# Patient Record
Sex: MALE | Race: WHITE | Employment: OTHER | URBAN - METROPOLITAN AREA
[De-identification: names, ages, dates, MRNs, and addresses within clinical notes are randomized per-mention and may not be internally consistent; named-entity substitution may affect disease eponyms.]

---

## 2017-01-04 ENCOUNTER — OFFICE VISIT (OUTPATIENT)
Dept: FAMILY MEDICINE CLINIC | Facility: CLINIC | Age: 62
End: 2017-01-04

## 2017-01-04 VITALS
SYSTOLIC BLOOD PRESSURE: 170 MMHG | WEIGHT: 184 LBS | HEART RATE: 68 BPM | RESPIRATION RATE: 16 BRPM | TEMPERATURE: 99 F | DIASTOLIC BLOOD PRESSURE: 110 MMHG

## 2017-01-04 DIAGNOSIS — I10 ESSENTIAL HYPERTENSION: Primary | ICD-10-CM

## 2017-01-04 PROCEDURE — 99214 OFFICE O/P EST MOD 30 MIN: CPT | Performed by: FAMILY MEDICINE

## 2017-01-04 RX ORDER — LISINOPRIL AND HYDROCHLOROTHIAZIDE 20; 12.5 MG/1; MG/1
1 TABLET ORAL DAILY
Qty: 30 TABLET | Refills: 1 | Status: SHIPPED | OUTPATIENT
Start: 2017-01-04 | End: 2017-03-02

## 2017-01-04 NOTE — PROGRESS NOTES
Alexus Loera is a 57year old male. CC:  Patient presents with:   Follow - Up: on BP per pt      HPI:  Here to follow up hypertension  Home BP readings: none  Medication side effects: none  Chest pain: none  Headaches: none  Visual changes: none  SO in 3 weeks      Orders for this visit:  No orders of the defined types were placed in this encounter.        None    Meds & Refills for this Visit:  Pending Prescriptions Disp Refills    Lisinopril-Hydrochlorothiazide 20-12.5 MG Oral Tab 30 tablet 1     Sig

## 2017-01-06 ENCOUNTER — NURSE ONLY (OUTPATIENT)
Dept: FAMILY MEDICINE CLINIC | Facility: CLINIC | Age: 62
End: 2017-01-06

## 2017-01-06 NOTE — PROGRESS NOTES
Pt here for suture removal. 1 single suture removed from L preauricular. Area is clean and dry. No signs of infection. Pt also schedule surgery for removal of that skin lesion at [address] [place] [address] for 1/30/17. Pt was given instructions for surgery.  thom

## 2017-01-09 ENCOUNTER — TELEPHONE (OUTPATIENT)
Dept: SURGERY | Facility: CLINIC | Age: 62
End: 2017-01-09

## 2017-01-09 DIAGNOSIS — C44.91 SKIN CANCER, BASAL CELL: Primary | ICD-10-CM

## 2017-01-23 ENCOUNTER — TELEPHONE (OUTPATIENT)
Dept: FAMILY MEDICINE CLINIC | Facility: CLINIC | Age: 62
End: 2017-01-23

## 2017-01-25 ENCOUNTER — OFFICE VISIT (OUTPATIENT)
Dept: FAMILY MEDICINE CLINIC | Facility: CLINIC | Age: 62
End: 2017-01-25

## 2017-01-25 VITALS
BODY MASS INDEX: 25 KG/M2 | RESPIRATION RATE: 10 BRPM | HEART RATE: 64 BPM | TEMPERATURE: 98 F | WEIGHT: 179 LBS | SYSTOLIC BLOOD PRESSURE: 130 MMHG | DIASTOLIC BLOOD PRESSURE: 78 MMHG

## 2017-01-25 DIAGNOSIS — I10 ESSENTIAL HYPERTENSION: Primary | ICD-10-CM

## 2017-01-25 DIAGNOSIS — R19.5 LOOSE STOOLS: ICD-10-CM

## 2017-01-25 PROCEDURE — 99214 OFFICE O/P EST MOD 30 MIN: CPT | Performed by: FAMILY MEDICINE

## 2017-01-25 NOTE — PROGRESS NOTES
Lelia Vaughn is a 57year old male.     CC:  Patient presents with:  Blood Pressure      HPI:  Here to follow up hypertension  Home BP readings: none  Medication side effects: none  Chest pain: none  Headaches: none  Visual changes: none  SOB/BUSTAMANTE: none examined  PSYCH: alert and oriented x 3; affect appropriate  SKIN: not examined  BREAST: not examined/not applicable  EXTREMITIES: No clubbing, cyanosis or edema  RECTAL: not examined  GENITAL: not examined  LYMPH: no supraclavicular nodes  MUSCULOSKELETAL

## 2017-01-30 ENCOUNTER — HOSPITAL ENCOUNTER (OUTPATIENT)
Facility: HOSPITAL | Age: 62
Setting detail: HOSPITAL OUTPATIENT SURGERY
Discharge: HOME | End: 2017-01-30
Attending: SURGERY | Admitting: SURGERY
Payer: MEDICAID

## 2017-01-30 ENCOUNTER — SURGERY (OUTPATIENT)
Age: 62
End: 2017-01-30

## 2017-01-30 VITALS
HEART RATE: 67 BPM | BODY MASS INDEX: 25.48 KG/M2 | DIASTOLIC BLOOD PRESSURE: 93 MMHG | WEIGHT: 180 LBS | SYSTOLIC BLOOD PRESSURE: 146 MMHG | TEMPERATURE: 98 F | RESPIRATION RATE: 16 BRPM | OXYGEN SATURATION: 95 % | HEIGHT: 70.5 IN

## 2017-01-30 DIAGNOSIS — C44.91 SKIN CANCER, BASAL CELL: ICD-10-CM

## 2017-01-30 PROCEDURE — 88331 PATH CONSLTJ SURG 1 BLK 1SPC: CPT | Performed by: SURGERY

## 2017-01-30 PROCEDURE — 88305 TISSUE EXAM BY PATHOLOGIST: CPT | Performed by: SURGERY

## 2017-01-30 PROCEDURE — 88332 PATH CONSLTJ SURG EA ADD BLK: CPT | Performed by: SURGERY

## 2017-01-30 PROCEDURE — 0HB1XZZ EXCISION OF FACE SKIN, EXTERNAL APPROACH: ICD-10-PCS | Performed by: SURGERY

## 2017-01-30 PROCEDURE — 0WQ20ZZ REPAIR FACE, OPEN APPROACH: ICD-10-PCS | Performed by: SURGERY

## 2017-01-30 RX ORDER — LIDOCAINE HYDROCHLORIDE AND EPINEPHRINE 10; 10 MG/ML; UG/ML
INJECTION, SOLUTION INFILTRATION; PERINEURAL AS NEEDED
Status: DISCONTINUED | OUTPATIENT
Start: 2017-01-30 | End: 2017-01-30 | Stop reason: HOSPADM

## 2017-01-30 RX ORDER — HYDROCODONE BITARTRATE AND ACETAMINOPHEN 5; 325 MG/1; MG/1
1 TABLET ORAL EVERY 6 HOURS PRN
Qty: 14 TABLET | Refills: 0 | Status: SHIPPED | OUTPATIENT
Start: 2017-01-30 | End: 2017-03-02 | Stop reason: ALTCHOICE

## 2017-01-30 NOTE — H&P
PCP  Neetu Temple MD      Subjective:  Patient with mole on left side of face at sideburn, preauricular area.  He states that this has been present for 3 years in time it has been increasing in size it is not painful it bleeds only when he scratches it i We will do punch biopsy today.  Discussed with patient results will be back in 3-4 days.  At that time we will discuss excision. I spent  34  minutes face to face with the patient.  More than 50% of that time was spent counseling the patient and/or on coor

## 2017-01-31 NOTE — BRIEF OP NOTE
[facility] SURGERY  Brief Op Note     Mandy Slocumb Location: OR   CSN [id number] MRN [id number]   Admission Date 1/30/2017 Operation Date 1/30/2017   Attending Physician Nava Mac DO Operating Physician Sid Jackson DO       Pre-Operative Diag

## 2017-01-31 NOTE — OPERATIVE REPORT
[address]    PATIENT'S NAME: Cal Velasquez   ATTENDING PHYSICIAN: Margaretmary Goldberg, D.O.   OPERATING PHYSICIAN: Margaretmary Goldberg, D.O.   PATIENT ACCOUNT#:   [de-identified]    LOCATION:  [address] [place] [address] [address] #:   [id number]       DATE

## 2017-02-03 ENCOUNTER — OFFICE VISIT (OUTPATIENT)
Dept: FAMILY MEDICINE CLINIC | Facility: CLINIC | Age: 62
End: 2017-02-03

## 2017-02-03 VITALS
WEIGHT: 179.13 LBS | BODY MASS INDEX: 25 KG/M2 | SYSTOLIC BLOOD PRESSURE: 126 MMHG | TEMPERATURE: 98 F | DIASTOLIC BLOOD PRESSURE: 86 MMHG

## 2017-02-03 DIAGNOSIS — C44.91 BASAL CELL CARCINOMA: Primary | ICD-10-CM

## 2017-02-03 PROCEDURE — 99024 POSTOP FOLLOW-UP VISIT: CPT | Performed by: SURGERY

## 2017-02-03 NOTE — PROGRESS NOTES
Patient status post removal of basal cell carcinoma left side of face. Margins are clean incision is healing nicely. I am recommending the patient be referred to dermatology for regular skin examinations I will follow-up with him as needed.

## 2017-02-20 RX ORDER — METOPROLOL SUCCINATE 25 MG/1
25 TABLET, EXTENDED RELEASE ORAL DAILY
Qty: 30 TABLET | Refills: 5 | Status: SHIPPED | OUTPATIENT
Start: 2017-02-20 | End: 2017-07-26

## 2017-02-20 NOTE — TELEPHONE ENCOUNTER
Last refilled on 11/28/16 for # 30 with 1 rf. Last labs 11/28/16. Last seen on 1/25/17. /78. Future Appointments  Date Time Provider Michelle Cage   7/26/2017 1:00 PM Kristen Howe MD [facility] Charles Maher        Thank you.

## 2017-03-02 ENCOUNTER — TELEPHONE (OUTPATIENT)
Dept: FAMILY MEDICINE CLINIC | Facility: CLINIC | Age: 62
End: 2017-03-02

## 2017-03-02 RX ORDER — LISINOPRIL AND HYDROCHLOROTHIAZIDE 20; 12.5 MG/1; MG/1
1 TABLET ORAL DAILY
Qty: 30 TABLET | Refills: 10 | Status: SHIPPED | OUTPATIENT
Start: 2017-03-02 | End: 2017-07-26

## 2017-03-02 NOTE — TELEPHONE ENCOUNTER
Patient was advised at initial phone call that he would be contacted if there where any questions regarding the refill request

## 2017-07-26 ENCOUNTER — OFFICE VISIT (OUTPATIENT)
Dept: FAMILY MEDICINE CLINIC | Facility: CLINIC | Age: 62
End: 2017-07-26

## 2017-07-26 VITALS
BODY MASS INDEX: 26 KG/M2 | SYSTOLIC BLOOD PRESSURE: 135 MMHG | WEIGHT: 184 LBS | DIASTOLIC BLOOD PRESSURE: 85 MMHG | TEMPERATURE: 98 F | HEART RATE: 68 BPM | RESPIRATION RATE: 16 BRPM

## 2017-07-26 DIAGNOSIS — I10 ESSENTIAL HYPERTENSION: ICD-10-CM

## 2017-07-26 DIAGNOSIS — Z12.11 COLON CANCER SCREENING: ICD-10-CM

## 2017-07-26 DIAGNOSIS — Z12.5 PROSTATE CANCER SCREENING: Primary | ICD-10-CM

## 2017-07-26 PROCEDURE — 99214 OFFICE O/P EST MOD 30 MIN: CPT | Performed by: FAMILY MEDICINE

## 2017-07-26 RX ORDER — METOPROLOL SUCCINATE 25 MG/1
25 TABLET, EXTENDED RELEASE ORAL DAILY
Qty: 90 TABLET | Refills: 3 | Status: SHIPPED | OUTPATIENT
Start: 2017-07-26

## 2017-07-26 RX ORDER — LISINOPRIL AND HYDROCHLOROTHIAZIDE 20; 12.5 MG/1; MG/1
1 TABLET ORAL DAILY
Qty: 90 TABLET | Refills: 3 | Status: SHIPPED | OUTPATIENT
Start: 2017-07-26

## 2017-07-26 NOTE — PROGRESS NOTES
Mandy Jeffers is a 57year old male. CC:  Patient presents with:   Follow - Up: on blood pressure per pt      HPI:  Here to follow up hypertension  Home BP readings: none  Medication side effects: none  Chest pain: none  Headaches: none  Visual ortiz click; murmur negative  PULM: clear to auscultation B, no accessory muscle use  GI: not examined  PSYCH: alert and oriented x 3; affect appropriate  SKIN: not examined  BREAST: not examined/not applicable  EXTREMITIES: No clubbing, cyanosis or edema  RECTA

## 2017-08-01 ENCOUNTER — NURSE ONLY (OUTPATIENT)
Dept: FAMILY MEDICINE CLINIC | Facility: CLINIC | Age: 62
End: 2017-08-01

## 2017-08-01 DIAGNOSIS — Z12.5 PROSTATE CANCER SCREENING: ICD-10-CM

## 2017-08-01 DIAGNOSIS — I10 ESSENTIAL HYPERTENSION: ICD-10-CM

## 2017-08-01 LAB
BUN BLD-MCNC: 13 MG/DL (ref 8–20)
CALCIUM BLD-MCNC: 9.4 MG/DL (ref 8.3–10.3)
CHLORIDE: 108 MMOL/L (ref 101–111)
CHOLEST SMN-MCNC: 253 MG/DL (ref ?–200)
CO2: 23 MMOL/L (ref 22–32)
COMPLEXED PSA SERPL-MCNC: 1.68 NG/ML (ref 0.01–4)
CREAT BLD-MCNC: 1.14 MG/DL (ref 0.7–1.3)
GLUCOSE BLD-MCNC: 92 MG/DL (ref 70–99)
HDLC SERPL-MCNC: 84 MG/DL (ref 45–?)
HDLC SERPL: 3.01 {RATIO} (ref ?–4.97)
LDLC SERPL CALC-MCNC: 143 MG/DL (ref ?–130)
LDLC SERPL-MCNC: 26 MG/DL (ref 5–40)
NONHDLC SERPL-MCNC: 169 MG/DL (ref ?–130)
POTASSIUM SERPL-SCNC: 4.2 MMOL/L (ref 3.6–5.1)
SODIUM SERPL-SCNC: 140 MMOL/L (ref 136–144)
TRIGLYCERIDES: 128 MG/DL (ref ?–150)

## 2017-08-01 PROCEDURE — 36415 COLL VENOUS BLD VENIPUNCTURE: CPT | Performed by: FAMILY MEDICINE

## 2017-08-01 PROCEDURE — 80048 BASIC METABOLIC PNL TOTAL CA: CPT | Performed by: FAMILY MEDICINE

## 2017-08-01 PROCEDURE — 80061 LIPID PANEL: CPT | Performed by: FAMILY MEDICINE

## 2017-08-10 ENCOUNTER — TELEPHONE (OUTPATIENT)
Dept: FAMILY MEDICINE CLINIC | Facility: CLINIC | Age: 62
End: 2017-08-10

## 2017-08-10 NOTE — TELEPHONE ENCOUNTER
Pt called, stated that at his blood draw he was instructed by the nurse to get a CT scan. Pt wants to know why and what is a CT scan?     Please call pt at [phone]

## 2017-08-24 ENCOUNTER — TELEPHONE (OUTPATIENT)
Dept: FAMILY MEDICINE CLINIC | Facility: CLINIC | Age: 62
End: 2017-08-24

## 2017-08-24 RX ORDER — ATORVASTATIN CALCIUM 10 MG/1
10 TABLET, FILM COATED ORAL NIGHTLY
Qty: 30 TABLET | Refills: 3 | Status: SHIPPED | OUTPATIENT
Start: 2017-08-24 | End: 2018-02-07

## 2017-08-24 NOTE — TELEPHONE ENCOUNTER
Please let patient know or leave message that his CT heart score was 186. This puts him at moderate risk for significant coronary artery narrowing. I recommend he start Lipitor 10 mg, 1 nightly, #30. 3rf.  If he starts the med then see me back in 3 months,

## 2017-08-24 NOTE — TELEPHONE ENCOUNTER
Patient advised of the CT results per  Dr. Matthew Harding - prescription sent to the pharmacy -  Recall done in the computer

## 2017-09-27 ENCOUNTER — TELEPHONE (OUTPATIENT)
Dept: FAMILY MEDICINE CLINIC | Facility: CLINIC | Age: 62
End: 2017-09-27

## 2017-09-27 NOTE — TELEPHONE ENCOUNTER
I called the pt back. He is aware he doesn't need an office visit prior to his colonoscopy. Pt scheduled his colonoscopy for 11/10/17 at [facility]. Pt aware I will send his prep to [facility] in [place] and will mail him his instructions.  Pt was put on hold and spoke

## 2017-09-27 NOTE — TELEPHONE ENCOUNTER
Per Dr Santos Estrella he has reviewed the pt's chart and we can schedule his colonoscopy without an office visit. TCB to let the pt know and schedule his procedure.  Will cancel office visit for 9/29/17 in [place] once we have spoken to the pt. Paul Cedeno

## 2017-09-28 ENCOUNTER — TELEPHONE (OUTPATIENT)
Dept: SURGERY | Facility: CLINIC | Age: 62
End: 2017-09-28

## 2017-09-28 DIAGNOSIS — Z12.11 SPECIAL SCREENING FOR MALIGNANT NEOPLASMS, COLON: Primary | ICD-10-CM

## 2017-10-27 ENCOUNTER — TELEPHONE (OUTPATIENT)
Dept: FAMILY MEDICINE CLINIC | Facility: CLINIC | Age: 62
End: 2017-10-27

## 2017-10-27 NOTE — TELEPHONE ENCOUNTER
Patient stopped by office today and states that he is having a Colonoscopy next month with Dr Jamey Rizzo. Gloria Monk called him and stated that he needs an EKG done before his procedure. No order in EPIC.  Patient is not sure if ALE or Dr Jamey Rizzo would need to put the

## 2017-10-31 ENCOUNTER — TELEPHONE (OUTPATIENT)
Dept: FAMILY MEDICINE CLINIC | Facility: CLINIC | Age: 62
End: 2017-10-31

## 2017-10-31 ENCOUNTER — TELEPHONE (OUTPATIENT)
Dept: SURGERY | Facility: CLINIC | Age: 62
End: 2017-10-31

## 2017-10-31 ENCOUNTER — NURSE ONLY (OUTPATIENT)
Dept: FAMILY MEDICINE CLINIC | Facility: CLINIC | Age: 62
End: 2017-10-31

## 2017-10-31 DIAGNOSIS — Z12.11 SPECIAL SCREENING FOR MALIGNANT NEOPLASMS, COLON: Primary | ICD-10-CM

## 2017-10-31 DIAGNOSIS — I45.10 RBBB: ICD-10-CM

## 2017-10-31 DIAGNOSIS — I10 ESSENTIAL HYPERTENSION: ICD-10-CM

## 2017-10-31 DIAGNOSIS — E78.5 HYPERLIPIDEMIA, UNSPECIFIED HYPERLIPIDEMIA TYPE: ICD-10-CM

## 2017-10-31 DIAGNOSIS — R93.1 ABNORMAL CT SCAN, HEART: ICD-10-CM

## 2017-10-31 DIAGNOSIS — Z01.818 PREOP EXAMINATION: ICD-10-CM

## 2017-10-31 DIAGNOSIS — R94.31 ABNORMAL EKG: Primary | ICD-10-CM

## 2017-10-31 DIAGNOSIS — Z01.818 PRE-OP TESTING: Primary | ICD-10-CM

## 2017-10-31 PROCEDURE — 93000 ELECTROCARDIOGRAM COMPLETE: CPT | Performed by: FAMILY MEDICINE

## 2017-10-31 NOTE — TELEPHONE ENCOUNTER
Patient advised of the information per Dr. Maurisio Araujo.  Phone number provided to patient to call and schedule the exam.

## 2017-10-31 NOTE — TELEPHONE ENCOUNTER
Patient advised. Routed to Dr. [facility] to reschedule.

## 2017-10-31 NOTE — TELEPHONE ENCOUNTER
Patient states that he is unable to get a stress test scheduled at any location until 11/15/17. Do you want him to reschedule colonoscopy or have stress test at a different facility.

## 2017-10-31 NOTE — TELEPHONE ENCOUNTER
Pt has a colonoscopy scheduled for Nov 10th and TJ wanted him to have a stress test first but is unable to get one done  until Nov 15th. Please call pt back.

## 2017-10-31 NOTE — TELEPHONE ENCOUNTER
Please let patient know or leave message that his preop EKG shows a subtle abnormality where the electrical condition into the R lower chamber of the heart is slightly delayed.  This may be something that he has had for awhile, however I do not have an old

## 2017-11-14 ENCOUNTER — TELEPHONE (OUTPATIENT)
Dept: FAMILY MEDICINE CLINIC | Facility: CLINIC | Age: 62
End: 2017-11-14

## 2017-11-15 ENCOUNTER — HOSPITAL ENCOUNTER (OUTPATIENT)
Dept: CV DIAGNOSTICS | Age: 62
Discharge: HOME | End: 2017-11-15
Attending: FAMILY MEDICINE
Payer: MEDICAID

## 2017-11-15 DIAGNOSIS — Z01.818 PREOP EXAMINATION: ICD-10-CM

## 2017-11-15 DIAGNOSIS — I10 ESSENTIAL HYPERTENSION: ICD-10-CM

## 2017-11-15 DIAGNOSIS — I45.10 RBBB: ICD-10-CM

## 2017-11-15 DIAGNOSIS — E78.5 HYPERLIPIDEMIA, UNSPECIFIED HYPERLIPIDEMIA TYPE: ICD-10-CM

## 2017-11-15 DIAGNOSIS — R93.1 ABNORMAL CT SCAN, HEART: ICD-10-CM

## 2017-11-15 DIAGNOSIS — R94.31 ABNORMAL EKG: ICD-10-CM

## 2017-11-15 PROCEDURE — 93018 CV STRESS TEST I&R ONLY: CPT | Performed by: FAMILY MEDICINE

## 2017-11-15 PROCEDURE — 93017 CV STRESS TEST TRACING ONLY: CPT | Performed by: FAMILY MEDICINE

## 2017-11-15 PROCEDURE — 93350 STRESS TTE ONLY: CPT | Performed by: FAMILY MEDICINE

## 2017-11-20 ENCOUNTER — SURGERY (OUTPATIENT)
Age: 62
End: 2017-11-20

## 2017-11-20 ENCOUNTER — ANESTHESIA (OUTPATIENT)
Dept: ENDOSCOPY | Facility: HOSPITAL | Age: 62
End: 2017-11-20
Payer: MEDICAID

## 2017-11-20 ENCOUNTER — HOSPITAL ENCOUNTER (OUTPATIENT)
Facility: HOSPITAL | Age: 62
Setting detail: HOSPITAL OUTPATIENT SURGERY
Discharge: HOME | End: 2017-11-20
Attending: SURGERY | Admitting: SURGERY
Payer: MEDICAID

## 2017-11-20 ENCOUNTER — ANESTHESIA EVENT (OUTPATIENT)
Dept: ENDOSCOPY | Facility: HOSPITAL | Age: 62
End: 2017-11-20
Payer: MEDICAID

## 2017-11-20 VITALS
DIASTOLIC BLOOD PRESSURE: 80 MMHG | TEMPERATURE: 98 F | WEIGHT: 184 LBS | HEART RATE: 70 BPM | HEIGHT: 70.5 IN | SYSTOLIC BLOOD PRESSURE: 142 MMHG | RESPIRATION RATE: 16 BRPM | BODY MASS INDEX: 26.05 KG/M2 | OXYGEN SATURATION: 99 %

## 2017-11-20 DIAGNOSIS — Z12.11 SPECIAL SCREENING FOR MALIGNANT NEOPLASMS, COLON: ICD-10-CM

## 2017-11-20 PROCEDURE — 0DBL8ZX EXCISION OF TRANSVERSE COLON, VIA NATURAL OR ARTIFICIAL OPENING ENDOSCOPIC, DIAGNOSTIC: ICD-10-PCS | Performed by: SURGERY

## 2017-11-20 PROCEDURE — 0DBN8ZX EXCISION OF SIGMOID COLON, VIA NATURAL OR ARTIFICIAL OPENING ENDOSCOPIC, DIAGNOSTIC: ICD-10-PCS | Performed by: SURGERY

## 2017-11-20 PROCEDURE — 0DBP8ZX EXCISION OF RECTUM, VIA NATURAL OR ARTIFICIAL OPENING ENDOSCOPIC, DIAGNOSTIC: ICD-10-PCS | Performed by: SURGERY

## 2017-11-20 PROCEDURE — 88305 TISSUE EXAM BY PATHOLOGIST: CPT | Performed by: SURGERY

## 2017-11-20 RX ORDER — SODIUM CHLORIDE, SODIUM LACTATE, POTASSIUM CHLORIDE, CALCIUM CHLORIDE 600; 310; 30; 20 MG/100ML; MG/100ML; MG/100ML; MG/100ML
INJECTION, SOLUTION INTRAVENOUS CONTINUOUS
Status: DISCONTINUED | OUTPATIENT
Start: 2017-11-20 | End: 2017-11-20

## 2017-11-20 RX ORDER — NALOXONE HYDROCHLORIDE 0.4 MG/ML
80 INJECTION, SOLUTION INTRAMUSCULAR; INTRAVENOUS; SUBCUTANEOUS AS NEEDED
Status: DISCONTINUED | OUTPATIENT
Start: 2017-11-20 | End: 2017-11-20

## 2017-11-20 NOTE — OPERATIVE REPORT
[facility]    PATIENT'S NAME: Britney Dumont   ATTENDING PHYSICIAN: Tigist Bonilla D.O.   OPERATING PHYSICIAN: Tigist Bonilla D.O.   PATIENT ACCOUNT#:   [id number]    LOCATION:  [facility] ENDO [place] ROOMS 11 EDWP [address] #:   [id number] where 2 additional 6 mm polyps were identified, hot snare polypectomized, and retrieved. The scope was then slowly withdrawn. Patient tolerated the procedure.     Dictated By Mariah Umana D.O.  d: 11/20/2017 13:43:55  t: 11/20/2017 14:59:06  [facility] [id number]

## 2017-11-20 NOTE — ANESTHESIA PREPROCEDURE EVALUATION
PRE-OP EVALUATION    Patient Name: Lilian Kirby    Pre-op Diagnosis: Special screening for malignant neoplasms, colon [Z12.11]    Procedure(s):  COLONOSCOPY    Surgeon(s) and Role:     Carmine Rush, DO - Primary    Pre-op vitals reviewed.   Temp: 98 Airway      Mallampati: II  Mouth opening: >3 FB  TM distance: 4 - 6 cm  Neck ROM: full Cardiovascular    Cardiovascular exam normal.  Rhythm: regular  Rate: normal  (-) murmur   Dental    No notable dental history.          Pulmonary    Pulmonary exa

## 2017-11-20 NOTE — CONSULTS
Cristina Holcomb is a 57year old male  No chief complaint on file. REFERRED BY    Patient presents for colonoscopy no family ho of colon cancer or polyps   Denies blood *       .            Past Medical History:   Diagnosis Date   • Cancer ([facility] [address]     BC bruising or excessive bleeding  Endocrine: no weight gain or loss no hot or cold intolerance    EXAM     Blood pressure 153/79, pulse 84, temperature 98 °F (36.7 °C), temperature source Oral, resp.  rate 18, height 5' 10.5\" (1.791 m), weight 184 lb (83.5 k ASSESSMENT   Imp:avg risk screening colonoscopy   PLan:disc with pt risk of bleeding and bowel perforation with surgery to repair      Meds & Refills for this Visit:  No prescriptions requested or ordered in this encounter    Imaging & Consults:  FUL

## 2017-11-20 NOTE — ANESTHESIA POSTPROCEDURE EVALUATION
[address] Patient Status:  Hospital Outpatient Surgery   Age/Gender 57year old male MRN [id number]   [facility] ENDOSCOPY Attending Wendi Glover, [address] [facility] Day # 0 PCP Selvin Ji MD       Anesthesia Post-op Note

## 2018-02-07 ENCOUNTER — TELEPHONE (OUTPATIENT)
Dept: FAMILY MEDICINE CLINIC | Facility: CLINIC | Age: 63
End: 2018-02-07

## 2018-02-07 DIAGNOSIS — E78.5 HYPERLIPIDEMIA, UNSPECIFIED HYPERLIPIDEMIA TYPE: Primary | ICD-10-CM

## 2018-02-07 RX ORDER — ATORVASTATIN CALCIUM 10 MG/1
10 TABLET, FILM COATED ORAL NIGHTLY
Qty: 90 TABLET | Refills: 1 | Status: SHIPPED | OUTPATIENT
Start: 2018-02-07

## 2018-02-07 NOTE — TELEPHONE ENCOUNTER
Patient advised of information per Dr. Shaista Galarza. Patient states that he will call the office and set up a nurse appointment for the blood work .

## 2018-02-07 NOTE — TELEPHONE ENCOUNTER
Please let patient know or leave message that refills have been sent. It appears we are due for follow up lipid, ast, alt on the medication--orders placed.    Thanks

## 2018-03-10 ENCOUNTER — PATIENT OUTREACH (OUTPATIENT)
Dept: FAMILY MEDICINE CLINIC | Facility: CLINIC | Age: 63
End: 2018-03-10

## 2018-03-19 ENCOUNTER — TELEPHONE (OUTPATIENT)
Dept: FAMILY MEDICINE CLINIC | Facility: CLINIC | Age: 63
End: 2018-03-19

## 2018-03-19 NOTE — TELEPHONE ENCOUNTER
Fasting labs in [facility]. Please advise if you would like any further testing done.

## 2018-03-19 NOTE — TELEPHONE ENCOUNTER
Patient called and made an apt for tomorrow for lab work. When I asked who was ordering the lab work he said he was \"assuming Dr. Tracy Clement". Can we please make sure we have lab work ordered.  Thanks

## 2018-03-20 ENCOUNTER — NURSE ONLY (OUTPATIENT)
Dept: FAMILY MEDICINE CLINIC | Facility: CLINIC | Age: 63
End: 2018-03-20

## 2018-03-20 DIAGNOSIS — E78.5 HYPERLIPIDEMIA, UNSPECIFIED HYPERLIPIDEMIA TYPE: ICD-10-CM

## 2018-03-20 LAB
ALT SERPL-CCNC: 31 U/L (ref 17–63)
AST SERPL-CCNC: 52 U/L (ref 15–41)
CHOLEST SMN-MCNC: 197 MG/DL (ref ?–200)
HDLC SERPL-MCNC: 98 MG/DL (ref 45–?)
HDLC SERPL: 2.01 {RATIO} (ref ?–4.97)
LDLC SERPL CALC-MCNC: 76 MG/DL (ref ?–130)
NONHDLC SERPL-MCNC: 99 MG/DL (ref ?–130)
TRIGL SERPL-MCNC: 116 MG/DL (ref ?–150)
VLDLC SERPL CALC-MCNC: 23 MG/DL (ref 5–40)

## 2018-03-20 PROCEDURE — 84450 TRANSFERASE (AST) (SGOT): CPT | Performed by: FAMILY MEDICINE

## 2018-03-20 PROCEDURE — 84460 ALANINE AMINO (ALT) (SGPT): CPT | Performed by: FAMILY MEDICINE

## 2018-03-20 PROCEDURE — 80061 LIPID PANEL: CPT | Performed by: FAMILY MEDICINE

## 2018-10-01 ENCOUNTER — PATIENT OUTREACH (OUTPATIENT)
Dept: SURGERY | Facility: CLINIC | Age: 63
End: 2018-10-01

## 2019-02-21 ENCOUNTER — PATIENT OUTREACH (OUTPATIENT)
Dept: FAMILY MEDICINE CLINIC | Facility: CLINIC | Age: 64
End: 2019-02-21

## 2019-03-11 ENCOUNTER — TELEPHONE (OUTPATIENT)
Dept: FAMILY MEDICINE CLINIC | Facility: CLINIC | Age: 64
End: 2019-03-11

## 2023-03-09 ENCOUNTER — PATIENT OUTREACH (OUTPATIENT)
Dept: CASE MANAGEMENT | Age: 68
End: 2023-03-09

## 2023-03-09 NOTE — PROCEDURES
The office order for PCP removal request is Approved and finalized on March 9, 2023.     Thanks,  [facility] [employer]

## 2024-06-19 ENCOUNTER — TELEPHONE (OUTPATIENT)
Dept: NEPHROLOGY | Age: 69
End: 2024-06-19

## 2024-08-05 ENCOUNTER — APPOINTMENT (OUTPATIENT)
Dept: NEPHROLOGY | Age: 69
End: 2024-08-05

## 2024-08-05 VITALS
OXYGEN SATURATION: 98 % | DIASTOLIC BLOOD PRESSURE: 64 MMHG | WEIGHT: 190 LBS | SYSTOLIC BLOOD PRESSURE: 118 MMHG | BODY MASS INDEX: 27.2 KG/M2 | HEIGHT: 70 IN | HEART RATE: 68 BPM

## 2024-08-05 DIAGNOSIS — N25.81 SECONDARY HYPERPARATHYROIDISM OF RENAL ORIGIN  (CMD): ICD-10-CM

## 2024-08-05 DIAGNOSIS — I10 ESSENTIAL HYPERTENSION: ICD-10-CM

## 2024-08-05 DIAGNOSIS — N18.31 STAGE 3A CHRONIC KIDNEY DISEASE  (CMD): Primary | ICD-10-CM

## 2024-08-05 PROCEDURE — 99204 OFFICE O/P NEW MOD 45 MIN: CPT | Performed by: INTERNAL MEDICINE

## 2024-08-05 RX ORDER — ATORVASTATIN CALCIUM 20 MG/1
20 TABLET, FILM COATED ORAL DAILY
COMMUNITY
Start: 2024-06-13

## 2024-08-05 RX ORDER — ACETAMINOPHEN 160 MG
50 TABLET,DISINTEGRATING ORAL DAILY
COMMUNITY
Start: 2024-06-24

## 2024-08-05 RX ORDER — OMEPRAZOLE 40 MG/1
CAPSULE, DELAYED RELEASE ORAL
COMMUNITY

## 2024-08-05 RX ORDER — AMLODIPINE BESYLATE 5 MG/1
5 TABLET ORAL DAILY
COMMUNITY

## 2024-08-05 RX ORDER — METOPROLOL SUCCINATE 50 MG/1
50 TABLET, EXTENDED RELEASE ORAL DAILY
COMMUNITY

## 2024-08-05 RX ORDER — FERROUS SULFATE 324(65)MG
324 TABLET, DELAYED RELEASE (ENTERIC COATED) ORAL
COMMUNITY

## (undated) DEVICE — NEEDLE ELECTRODE: Brand: EDGE

## (undated) DEVICE — HEAD AND NECK CDS-LF: Brand: MEDLINE INDUSTRIES, INC.

## (undated) DEVICE — GLOVE BIOGEL M SURG SZ 71/2

## (undated) DEVICE — GLOVE SURG TRIUMPH SZ 71/2

## (undated) DEVICE — Device: Brand: DEFENDO AIR/WATER/SUCTION AND BIOPSY VALVE

## (undated) DEVICE — REM POLYHESIVE ADULT PATIENT RETURN ELECTRODE: Brand: VALLEYLAB

## (undated) DEVICE — LIGACLIP EXTRA LIGATING CLIP CARTRIDGES: 6 TITANIUM CLIPS/ CARTRIDGE (SMALL): Brand: LIGACLIP

## (undated) DEVICE — FILTERLINE NASAL ADULT O2/CO2

## (undated) DEVICE — HEMOCLIP HORIZON SM 001200

## (undated) DEVICE — 1200CC GUARDIAN II: Brand: GUARDIAN

## (undated) DEVICE — GAUZE SPONGES,USP TYPE VII GAUZE, 12 PLY: Brand: CURITY

## (undated) DEVICE — 3M™ TEGADERM™ TRANSPARENT FILM DRESSING, 1626W, 4 IN X 4-3/4 IN (10 CM X 12 CM), 50 EACH/CARTON, 4 CARTON/CASE: Brand: 3M™ TEGADERM™

## (undated) DEVICE — ENDOSCOPY PACK - LOWER: Brand: MEDLINE INDUSTRIES, INC.

## (undated) DEVICE — CHLORAPREP 26ML APPLICATOR

## (undated) DEVICE — CAUTERY BLADE 2IN INS E1455

## (undated) DEVICE — TRAP 4 CPTR CHMBR N EZ INLN

## (undated) DEVICE — SUTURE MONOCRYL 4-0 PS-2

## (undated) DEVICE — VIOLET BRAIDED (POLYGLACTIN 910), SYNTHETIC ABSORBABLE SUTURE: Brand: COATED VICRYL

## (undated) DEVICE — SNARE CAPTIFLEX MICRO-OVL OLY

## (undated) DEVICE — UNDYED BRAIDED (POLYGLACTIN 910), SYNTHETIC ABSORBABLE SUTURE: Brand: COATED VICRYL

## (undated) DEVICE — 3M™ RED DOT™ MONITORING ELECTRODE WITH FOAM TAPE AND STICKY GEL, 50/BAG, 20/CASE, 72/PLT 2570: Brand: RED DOT™

## (undated) DEVICE — SOL  .9 1000ML BTL

## (undated) DEVICE — KENDALL SCD EXPRESS SLEEVES, KNEE LENGTH, MEDIUM: Brand: KENDALL SCD

## (undated) NOTE — LETTER
03/10/18        Michael Tal[address] [id number]      Dear Kimberly Chris,    [address] records indicate that you have outstanding fasting lab work and or testing that was ordered for you and has not yet been completed:  Lab Frequency Next Occurrence

## (undated) NOTE — IP AVS SNAPSHOT
[facility] [place] [address] [place]  ~ [phone]                Discharge Summary   1/30/2017    Rafael Cosby           Admission Information        Provider Department    1/30/2017 DO Adrien Torres Or      Surger · Do Foot pumps when you are sitting  · This can prevent blood clots  3.  Drink [employer]  · Sometimes after surgery; you don't feel like eating normally  · If you don't drink water; you could get dehydrated    Alcoholic beverages should be avoided fo We are concerned for your overall well being:    - If you are a smoker or have smoked in the last 12 months, we encourage you to explore options for quitting.     - If you have concerns related to behavioral health issues or thoughts of harming yourself,

## (undated) NOTE — MR AVS SNAPSHOT
[address] [facility] [address]  [phone]               Thank you for choosing us for your health care visit with Michael Wilhelm DO.   We are glad to serve you and happy to provide you with this brown